# Patient Record
Sex: MALE | Race: OTHER | HISPANIC OR LATINO | Employment: FULL TIME | URBAN - METROPOLITAN AREA
[De-identification: names, ages, dates, MRNs, and addresses within clinical notes are randomized per-mention and may not be internally consistent; named-entity substitution may affect disease eponyms.]

---

## 2018-11-16 ENCOUNTER — HOSPITAL ENCOUNTER (EMERGENCY)
Facility: HOSPITAL | Age: 23
Discharge: HOME | End: 2018-11-16
Attending: EMERGENCY MEDICINE | Admitting: EMERGENCY MEDICINE
Payer: COMMERCIAL

## 2018-11-16 VITALS
RESPIRATION RATE: 20 BRPM | HEART RATE: 80 BPM | DIASTOLIC BLOOD PRESSURE: 99 MMHG | WEIGHT: 197 LBS | OXYGEN SATURATION: 99 % | TEMPERATURE: 98.3 F | HEIGHT: 67 IN | SYSTOLIC BLOOD PRESSURE: 134 MMHG | BODY MASS INDEX: 30.92 KG/M2

## 2018-11-16 DIAGNOSIS — S39.012A STRAIN OF LUMBAR REGION, INITIAL ENCOUNTER: Primary | ICD-10-CM

## 2018-11-16 PROCEDURE — 99283 EMERGENCY DEPT VISIT LOW MDM: CPT

## 2018-11-16 RX ORDER — NAPROXEN 375 MG/1
375 TABLET ORAL 2 TIMES DAILY WITH MEALS
Qty: 20 TABLET | Refills: 0 | Status: SHIPPED | OUTPATIENT
Start: 2018-11-16 | End: 2018-11-26

## 2018-11-16 RX ORDER — CYCLOBENZAPRINE HCL 10 MG
10 TABLET ORAL 3 TIMES DAILY PRN
Qty: 15 TABLET | Refills: 0 | Status: SHIPPED | OUTPATIENT
Start: 2018-11-16 | End: 2018-11-21

## 2018-11-16 RX ORDER — CYCLOBENZAPRINE HCL 10 MG
10 TABLET ORAL ONCE
Status: COMPLETED | OUTPATIENT
Start: 2018-11-16 | End: 2018-11-16

## 2018-11-16 RX ADMIN — CYCLOBENZAPRINE HYDROCHLORIDE 10 MG: 10 TABLET, FILM COATED ORAL at 15:24

## 2018-11-16 NOTE — DISCHARGE INSTRUCTIONS
Rest  Heat  Flexeril every 8 hours if needed for muscle spasm  Naprosyn twice daily to reduce inflammation  Follow up with your doctor for further evaluation possible physical therapy     Low Back Strain   WHAT YOU NEED TO KNOW:   Low back strain is an injury to your lower back muscles or tendons  Tendons are strong tissues that connect muscles to bones  The lower back supports most of your body weight and helps you move, twist, and bend  DISCHARGE INSTRUCTIONS:   Return to the emergency department if:   · You hear or feel a pop in your lower back  · You have increased swelling or pain in your lower back  · You have trouble moving your legs  · Your legs are numb  Contact your healthcare provider if:   · You have a fever  · Your pain does not go away, even after treatment  · You have questions or concerns about your condition or care  Medicines: The following medicines may be ordered by your healthcare provider:  · Acetaminophen decreases pain and fever  It is available without a doctor's order  Ask how much to take and how often to take it  Follow directions  Acetaminophen can cause liver damage if not taken correctly  · NSAIDs , such as ibuprofen, help decrease swelling, pain, and fever  This medicine is available with or without a doctor's order  NSAIDs can cause stomach bleeding or kidney problems in certain people  If you take blood thinner medicine, always ask your healthcare provider if NSAIDs are safe for you  Always read the medicine label and follow directions  · Muscle relaxers  help decrease pain and muscle spasms  · Prescription pain medicine  may be given  Ask how to take this medicine safely  · Take your medicine as directed  Contact your healthcare provider if you think your medicine is not helping or if you have side effects  Tell him or her if you are allergic to any medicine  Keep a list of the medicines, vitamins, and herbs you take   Include the amounts, and when and why you take them  Bring the list or the pill bottles to follow-up visits  Carry your medicine list with you in case of an emergency  Self-care:   · Rest  as directed  You may need to rest in bed for a period of time after your injury  Do not lift heavy objects  · Apply ice  on your back for 15 to 20 minutes every hour or as directed  Use an ice pack, or put crushed ice in a plastic bag  Cover it with a towel  Ice helps prevent tissue damage and decreases swelling and pain  · Apply heat  on your lower back for 20 to 30 minutes every 2 hours for as many days as directed  Heat helps decrease pain and muscle spasms  · Slowly start to increase your activity  as the pain decreases, or as directed  Prevent another low back strain:   · Use correct body movements  ¨ Bend at the hips and knees when you  objects  Do not bend from the waist  Use your leg muscles as you lift the load  Do not use your back  Keep the object close to your chest as you lift it  Try not to twist or lift anything above your waist     ¨ Change your position often when you stand for long periods of time  Rest one foot on a small box or footrest, and then switch to the other foot often  ¨ Try not to sit for long periods of time  When you do, sit in a straight-backed chair with your feet flat on the floor  ¨ Never reach, pull, or push while you are sitting  · Warm up before you exercise  Do exercises that strengthen your back muscles  Ask your healthcare provider about the best exercise plan for you  · Maintain a healthy weight  Ask your healthcare provider how much you should weigh  Ask him to help you create a weight loss plan if you are overweight  © 2017 [facility] Information is for End User's use only and may not be sold, redistributed or otherwise used for commercial purposes   All illustrations and images included in CareNotes® are the copyrighted property of [employer]  or [employer]  The above information is an  only  It is not intended as medical advice for individual conditions or treatments  Talk to your doctor, nurse or pharmacist before following any medical regimen to see if it is safe and effective for you

## 2018-11-16 NOTE — ED PROVIDER NOTES
History  Chief Complaint   Patient presents with    Back Pain     Patient stated that he was shoveling the driveway and felt a sharp pain in the lower back     HPI patient is a 69-year-old male, reports that he was shoveling a driveway and developed low back pain  Patient describes a soreness across his low back  Describes pain with bending and with twisting  He denies any direct trauma  There is no pain radiating down the legs  Patient reports a history of having a back pain episode in the past but at resolved fairly quickly  Denies any chronic back pain  Denies any previous back injury or fracture  Denies any difficulty with urination or defecation  Past medical history previously healthy  Family history noncontributory  Social history nonsmoker no history of drug abuse employed    Prior to Admission Medications   Prescriptions Last Dose Informant Patient Reported? Taking? cyclobenzaprine (FLEXERIL) 10 mg tablet   No No   Sig: Take 1 tablet by mouth daily at bedtime   predniSONE 20 mg tablet   No No   Sig: Take 3 tablets by mouth daily      Facility-Administered Medications: None       History reviewed  No pertinent past medical history  History reviewed  No pertinent surgical history  History reviewed  No pertinent family history  I have reviewed and agree with the history as documented  Social History   Substance Use Topics    Smoking status: Never Smoker    Smokeless tobacco: Never Used    Alcohol use No        Review of Systems   Constitutional: Negative for diaphoresis, fatigue and fever  HENT: Negative for congestion, ear pain, nosebleeds and sore throat  Eyes: Negative for photophobia, pain, discharge and visual disturbance  Respiratory: Negative for cough, choking, chest tightness, shortness of breath and wheezing  Cardiovascular: Negative for chest pain and palpitations  Gastrointestinal: Negative for abdominal distention, abdominal pain, diarrhea and vomiting  Genitourinary: Negative for dysuria, flank pain and frequency  Musculoskeletal: Positive for back pain  Negative for gait problem and joint swelling  Skin: Negative for color change and rash  Neurological: Negative for dizziness, syncope and headaches  Psychiatric/Behavioral: Negative for behavioral problems and confusion  The patient is not nervous/anxious  All other systems reviewed and are negative  Physical Exam  Physical Exam   Constitutional: He is oriented to person, place, and time  He appears well-developed and well-nourished  HENT:   Head: Normocephalic  Right Ear: External ear normal    Left Ear: External ear normal    Nose: Nose normal    Mouth/Throat: Oropharynx is clear and moist    Eyes: Pupils are equal, round, and reactive to light  EOM and lids are normal    Neck: Normal range of motion  Neck supple  Cardiovascular: Normal rate, regular rhythm, normal heart sounds and intact distal pulses  Pulmonary/Chest: Effort normal and breath sounds normal  No respiratory distress  Musculoskeletal: He exhibits no deformity  There is low back tenderness there is some muscle spasm, there is pain with range of motion, negative straight leg raise, no focal weakness, no incontinence  Neurological: He is alert and oriented to person, place, and time  Skin: Skin is warm and dry  Psychiatric: He has a normal mood and affect  Nursing note and vitals reviewed        Vital Signs  ED Triage Vitals [11/16/18 1518]   Temperature Pulse Respirations Blood Pressure SpO2   98 3 °F (36 8 °C) 80 20 134/99 99 %      Temp Source Heart Rate Source Patient Position - Orthostatic VS BP Location FiO2 (%)   Oral Monitor Sitting Right arm --      Pain Score       --           Vitals:    11/16/18 1518   BP: 134/99   Pulse: 80   Patient Position - Orthostatic VS: Sitting       Visual Acuity      ED Medications  Medications   cyclobenzaprine (FLEXERIL) tablet 10 mg (10 mg Oral Given 11/16/18 1524) Diagnostic Studies  Results Reviewed     None                 No orders to display              Procedures  Procedures       Phone Contacts  ED Phone Contact    ED Course                               MDM medical decision making 79-year-old male reports shoveling snow today and developed low back pain, pain is worse with bending and moving there is bilateral muscle spasm and tenderness  We discussed muscle relaxers, discussed anti-inflammatories we discussed follow-up  CritCare Time    Disposition  Final diagnoses:   Strain of lumbar region, initial encounter     Time reflects when diagnosis was documented in both MDM as applicable and the Disposition within this note     Time User Action Codes Description Comment    11/16/2018  3:21 PM Janice Holman Add [S39 012A] Strain of lumbar region, initial encounter       ED Disposition     ED Disposition Condition Comment    Discharge  Brenden Due discharge to home/self care  Condition at discharge: Good        Follow-up Information     Follow up With Specialties Details Why Contact Info    Denice Younger MD Internal Medicine   [address] [place] [address]  [phone]            Discharge Medication List as of 11/16/2018  3:22 PM      START taking these medications    Details   !! cyclobenzaprine (FLEXERIL) 10 mg tablet Take 1 tablet (10 mg total) by mouth 3 (three) times a day as needed for muscle spasms for up to 5 days, Starting Fri 11/16/2018, Until Wed 11/21/2018, Print      naproxen (NAPROSYN) 375 mg tablet Take 1 tablet (375 mg total) by mouth 2 (two) times a day with meals for 20 doses, Starting Fri 11/16/2018, Until Mon 11/26/2018, Print       !! - Potential duplicate medications found  Please discuss with provider        CONTINUE these medications which have NOT CHANGED    Details   !! cyclobenzaprine (FLEXERIL) 10 mg tablet Take 1 tablet by mouth daily at bedtime, Starting 10/17/2016, Until Discontinued, Print      predniSONE 20 mg tablet Take 3 tablets by mouth daily, Starting 10/17/2016, Until Discontinued, Print       !! - Potential duplicate medications found  Please discuss with provider  No discharge procedures on file      ED Provider  Electronically Signed by           Salbador Brewer MD  11/16/18 0622

## 2019-03-06 ENCOUNTER — HOSPITAL ENCOUNTER (EMERGENCY)
Facility: HOSPITAL | Age: 24
Discharge: HOME | End: 2019-03-06
Attending: EMERGENCY MEDICINE
Payer: COMMERCIAL

## 2019-03-06 VITALS
BODY MASS INDEX: 36.26 KG/M2 | HEART RATE: 69 BPM | WEIGHT: 231.04 LBS | OXYGEN SATURATION: 98 % | TEMPERATURE: 98.3 F | DIASTOLIC BLOOD PRESSURE: 63 MMHG | HEIGHT: 67 IN | RESPIRATION RATE: 18 BRPM | SYSTOLIC BLOOD PRESSURE: 137 MMHG

## 2019-03-06 DIAGNOSIS — J02.9 VIRAL PHARYNGITIS: ICD-10-CM

## 2019-03-06 DIAGNOSIS — J06.9 URI (UPPER RESPIRATORY INFECTION): Primary | ICD-10-CM

## 2019-03-06 LAB — S PYO AG THROAT QL: NEGATIVE

## 2019-03-06 PROCEDURE — 99283 EMERGENCY DEPT VISIT LOW MDM: CPT

## 2019-03-06 PROCEDURE — 87430 STREP A AG IA: CPT | Performed by: EMERGENCY MEDICINE

## 2019-03-06 RX ORDER — IBUPROFEN 600 MG/1
600 TABLET ORAL EVERY 8 HOURS PRN
Qty: 20 TABLET | Refills: 0 | Status: SHIPPED | OUTPATIENT
Start: 2019-03-06

## 2019-03-06 RX ORDER — IBUPROFEN 600 MG/1
600 TABLET ORAL ONCE
Status: COMPLETED | OUTPATIENT
Start: 2019-03-06 | End: 2019-03-06

## 2019-03-06 RX ORDER — PSEUDOEPHEDRINE HCL 60 MG/1
60 TABLET ORAL EVERY 6 HOURS PRN
Qty: 30 TABLET | Refills: 0 | Status: SHIPPED | OUTPATIENT
Start: 2019-03-06

## 2019-03-06 RX ADMIN — IBUPROFEN 600 MG: 600 TABLET ORAL at 14:58

## 2019-03-06 NOTE — ED PROVIDER NOTES
History  Chief Complaint   Patient presents with    Sore Throat     pt c/o "burning in throat" when drinking or eating for the passed 5/6 days   Cough     pt c/o cough for the passed week  pt states to have productive cough with "green/yellow" phlegm  C/o sore throat, nasal congestion, cough for about a week  No fevers, no n/v/d  Prior to Admission Medications   Prescriptions Last Dose Informant Patient Reported? Taking? cyclobenzaprine (FLEXERIL) 10 mg tablet   No No   Sig: Take 1 tablet by mouth daily at bedtime   cyclobenzaprine (FLEXERIL) 10 mg tablet   No No   Sig: Take 1 tablet (10 mg total) by mouth 3 (three) times a day as needed for muscle spasms for up to 5 days   naproxen (NAPROSYN) 375 mg tablet   No No   Sig: Take 1 tablet (375 mg total) by mouth 2 (two) times a day with meals for 20 doses   predniSONE 20 mg tablet   No No   Sig: Take 3 tablets by mouth daily      Facility-Administered Medications: None       History reviewed  No pertinent past medical history  Past Surgical History:   Procedure Laterality Date    TONSILLECTOMY         History reviewed  No pertinent family history  I have reviewed and agree with the history as documented  Social History     Tobacco Use    Smoking status: Never Smoker    Smokeless tobacco: Never Used   Substance Use Topics    Alcohol use: No    Drug use: Yes     Types: Marijuana     Comment: occasional        Review of Systems   Constitutional: Negative for appetite change, fatigue and fever  HENT: Positive for congestion and sore throat  Respiratory: Positive for cough  Negative for shortness of breath and wheezing  Cardiovascular: Negative for chest pain and leg swelling  Gastrointestinal: Negative for abdominal pain, diarrhea and vomiting  Genitourinary: Negative for dysuria and flank pain  Musculoskeletal: Negative for back pain and neck pain  Skin: Negative for rash     Neurological: Negative for syncope and headaches  Psychiatric/Behavioral:        Mood normal       Physical Exam  Physical Exam   Constitutional: He is oriented to person, place, and time  He appears well-developed and well-nourished  HENT:   Head: Normocephalic and atraumatic  TMs clear bilaterally, oropharynx mildly erythematous, no exudate   Eyes: Pupils are equal, round, and reactive to light  Neck: Normal range of motion  Neck supple  Cardiovascular: Normal rate and regular rhythm  Pulmonary/Chest: Effort normal and breath sounds normal    Abdominal: Soft  There is no tenderness  Musculoskeletal: Normal range of motion  Neurological: He is alert and oriented to person, place, and time  Skin: Skin is warm and dry  Vitals reviewed        Vital Signs  ED Triage Vitals [03/06/19 1411]   Temperature Pulse Respirations Blood Pressure SpO2   98 3 °F (36 8 °C) 69 18 137/63 98 %      Temp Source Heart Rate Source Patient Position - Orthostatic VS BP Location FiO2 (%)   Oral Monitor Sitting Right arm --      Pain Score       7           Vitals:    03/06/19 1411   BP: 137/63   Pulse: 69   Patient Position - Orthostatic VS: Sitting       Visual Acuity      ED Medications  Medications   ibuprofen (MOTRIN) tablet 600 mg (600 mg Oral Given 3/6/19 1458)       Diagnostic Studies  Results Reviewed     Procedure Component Value Units Date/Time    Rapid Strep A Screen Only, Adults [[id number]]  (Normal) Collected:  03/06/19 1500    Lab Status:  Final result Specimen:  Throat Updated:  03/06/19 1513     Rapid Strep A Screen Negative                 No orders to display              Procedures  Procedures       Phone Contacts  ED Phone Contact    ED Course                               MDM    Disposition  Final diagnoses:   URI (upper respiratory infection)   Viral pharyngitis     Time reflects when diagnosis was documented in both MDM as applicable and the Disposition within this note     Time User Action Codes Description Comment    3/6/2019  3:36 PM Armando Castillo Add [J06 9] URI (upper respiratory infection)     3/6/2019  3:36 PM Kelly Gillette Add [J02 9] Viral pharyngitis       ED Disposition     ED Disposition Condition Date/Time Comment    Discharge Stable Wed Mar 6, 2019  3:36 PM Rod Hector discharge to home/self care  Follow-up Information     Follow up With Specialties Details Why Contact Info Additional Information    [facility] [address]   [address] [facility] [address] [facility] [address] [facility], [address] [facility] [address], [place], [place], [id number]          Discharge Medication List as of 3/6/2019  3:37 PM      START taking these medications    Details   ibuprofen (MOTRIN) 600 mg tablet Take 1 tablet (600 mg total) by mouth every 8 (eight) hours as needed for moderate pain, Starting Wed 3/6/2019, Print      pseudoephedrine (SUDAFED) 60 mg tablet Take 1 tablet (60 mg total) by mouth every 6 (six) hours as needed for congestion, Starting Wed 3/6/2019, Print         CONTINUE these medications which have NOT CHANGED    Details   cyclobenzaprine (FLEXERIL) 10 mg tablet Take 1 tablet by mouth daily at bedtime, Starting 10/17/2016, Until Discontinued, Print      naproxen (NAPROSYN) 375 mg tablet Take 1 tablet (375 mg total) by mouth 2 (two) times a day with meals for 20 doses, Starting Fri 11/16/2018, Until Mon 11/26/2018, Print      predniSONE 20 mg tablet Take 3 tablets by mouth daily, Starting 10/17/2016, Until Discontinued, Print           No discharge procedures on file      ED Provider  Electronically Signed by           Wang Rivera MD  03/20/19 2869